# Patient Record
Sex: MALE | Race: WHITE | NOT HISPANIC OR LATINO | Employment: OTHER | URBAN - NONMETROPOLITAN AREA
[De-identification: names, ages, dates, MRNs, and addresses within clinical notes are randomized per-mention and may not be internally consistent; named-entity substitution may affect disease eponyms.]

---

## 2017-09-13 ENCOUNTER — TELEPHONE (OUTPATIENT)
Dept: URGENT CARE | Facility: CLINIC | Age: 22
End: 2017-09-13

## 2019-05-15 ENCOUNTER — OFFICE VISIT (OUTPATIENT)
Dept: UROLOGY | Facility: CLINIC | Age: 24
End: 2019-05-15

## 2019-05-15 VITALS
OXYGEN SATURATION: 98 % | HEIGHT: 69 IN | SYSTOLIC BLOOD PRESSURE: 130 MMHG | HEART RATE: 57 BPM | WEIGHT: 175 LBS | BODY MASS INDEX: 25.92 KG/M2 | DIASTOLIC BLOOD PRESSURE: 80 MMHG | TEMPERATURE: 97.1 F

## 2019-05-15 DIAGNOSIS — E29.9 TESTICULAR DYSFUNCTION, UNSPECIFIED: Primary | ICD-10-CM

## 2019-05-15 PROCEDURE — 99203 OFFICE O/P NEW LOW 30 MIN: CPT | Performed by: UROLOGY

## 2019-05-15 NOTE — PROGRESS NOTES
Chief Complaint  Infertility    HPI  Mr. Ochoa Gomez is a 23 y.o. gentleman who presents to the clinic today due to concern for infertility.  He is in the  and was recently deployed this past winter.  He and his wife have been attempting conception for 2 months.  He has impregnated a partner in the past.    Sexual method  Neither use birth control and they do not use commercial lubricants.  [place] frequency: approximately 4 times per week. They have been having intercourse sporadically.     Partner  His partner is 24 years old and she has not previously been pregnant.  She has chronic medical conditions - PCOS (on metformin now).   She did not have regular menses prior to PCOS Tx.  She undergone a fertility work up - she is on clomid, has reportedly had bloodwork, but unclear if she has had HSG    Fertility-related history  No other surgeries, h/o radiation or childhood illnesses.      Fertility-related ROS  He does not use marijuana.  He does not have daily fatigue or recently decreased sexual drive/libido.  He has not noticed flushing or hot flashes  He not noticed breast swelling, tenderness or galactorrhea.  He has not had a history of STDs.  He does not have impairments of visual field, smell or chronic bronchitis.  He does not have a history of recent acute illnesses.  He does not use a laptop computer placed in his lap regularly.    He took a testosterone booster in January (prime T - vitamins), OTC while he was deployed. He denies steroid or testosterone usage.     Past Medical History  Past Medical History:   Diagnosis Date   • Seasonal allergies        Past Surgical History  Past Surgical History:   Procedure Laterality Date   • DENTAL PROCEDURE     • FRACTURE SURGERY         Medications    Current Outpatient Medications:   •  cetirizine (zyrTEC) 10 MG tablet, Take 1 tablet by mouth Daily., Disp: 30 tablet, Rfl: 2  •  fluticasone (FLONASE) 50 MCG/ACT nasal spray, 1-2 sprays each  "nostril daily, Disp: 1 each, Rfl: 2  •  ibuprofen (ADVIL,MOTRIN) 800 MG tablet, , Disp: , Rfl:   •  ketotifen (ZADITOR) 0.025 % ophthalmic solution, Administer 1 drop to both eyes 2 (Two) Times a Day., Disp: 1 each, Rfl: 0  •  levocetirizine (XYZAL) 5 MG tablet, Take 5 mg by mouth Every Evening., Disp: , Rfl:   •  montelukast (SINGULAIR) 10 MG tablet, Take 1 tablet by mouth Every Night., Disp: 30 tablet, Rfl: 0  •  ondansetron (ZOFRAN) 4 MG tablet, Take 1 tablet by mouth Every 8 (Eight) Hours As Needed for Nausea or Vomiting., Disp: 15 tablet, Rfl: 0    Allergies  No Known Allergies    Social History  Social History     Socioeconomic History   • Marital status:      Spouse name: Not on file   • Number of children: Not on file   • Years of education: Not on file   • Highest education level: Not on file   Tobacco Use   • Smoking status: Never Smoker   • Smokeless tobacco: Current User   Substance and Sexual Activity   • Alcohol use: Yes   • Drug use: No       Family History  He has no family history fertility-related issues or cystic fibrosis    Review of Systems  Constitutional: No fevers or chills  Skin: Negative for rash  Gastrointestinal: No constipation, nausea or vomiting  Genitourinary: No new lower urinary tract symptoms, gross hematuria or dysuria.  Musculoskeletal: No flank pain  Neurological:  Negative for frequent headaches or dizziness.    Physical Exam  Visit Vitals  /80   Pulse 57   Temp 97.1 °F (36.2 °C)   Ht 175.3 cm (69.02\")   Wt 79.4 kg (175 lb)   SpO2 98%   BMI 25.83 kg/m²     Constitutional: NAD, WDWN.   HEENT: NCAT. Conjunctivae normal.  MMM.    Cardiovascular: Regular rate.  Pulmonary/Chest: Respirations are even and non-labored bilaterally.  Abdominal: Soft. No distension, tenderness, masses or guarding. No CVA tenderness.  Neurological: A + O x 3.  Cranial Nerves II-XII grossly intact. Normal gait.  Extremities: MICHAEL x 4, Warm. No clubbing.  No cyanosis.    Skin: Pink, warm and " dry.  No rashes noted.  Psychiatric:  Normal mood and affect    Genitourinary  Penis: circumcised penis, glans normal, no penile discharge.  No rashes/lesions.  Meatus in normal position   Testes: Left: 25g, Right: 25g, no masses and normal consistency.  Vasa palpable bilaterally.  no clinically palpable varicocele with valsalva.    Labs  None recently    Semen Analysis  None recently    Assessment  23 y.o. male who has been trying to conceive for 2 months.   Risk factors:  None.  He has impregnated partner in the past.  His wife on the other hand however, has PCOS and is undergoing hormone treatment.  It is unclear exactly what her fertility work-up has been.    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    Plan  1.  Semen analysis   2.  FU in 2-3 weeks      No Follow-up on file.    Aris Sampson MD

## 2019-06-24 ENCOUNTER — LAB (OUTPATIENT)
Dept: LAB | Facility: HOSPITAL | Age: 24
End: 2019-06-24

## 2019-06-24 DIAGNOSIS — E29.9 DISORDER OF ENDOCRINE TESTIS: Primary | ICD-10-CM

## 2019-06-24 PROCEDURE — 89320 SEMEN ANAL VOL/COUNT/MOT: CPT

## 2019-07-06 LAB
CHARACTER SMN: NORMAL
COLOR SMN: NORMAL
LIQUEFACTION TIME SMN: NORMAL MIN
PH SMN: 8 [PH]
SPECIMEN VOL SMN: 2 ML (ref 2–5)
SPERM # SMN: 60.4 MILLIONS/ML
SPERM MORPHOLOGY COMMENT: NORMAL
SPERM MOTILE NFR SMN: 60 % MOTILE (ref 50–100)
VISC SMN: NORMAL CP

## 2022-04-04 ENCOUNTER — TELEPHONE (OUTPATIENT)
Dept: UROLOGY | Facility: CLINIC | Age: 27
End: 2022-04-04

## 2022-04-04 NOTE — TELEPHONE ENCOUNTER
Caller: Ochoa Gomez    Relationship: Self    Best call back number: [phone]    What form or medical record are you requesting: PROG NOTE AND SEMEN ANALYISIS    Who is requesting this form or medical record from you: PT    How would you like to receive the form or medical records (pick-up, mail, fax): MAIL  If mail, what is the address:   [address] [place] [id number]    Timeframe paperwork needed: AS SOON AS POSSIBLE     Additional notes: CALL NUMBER LISTED ABOVE TO CONFIRM RECORDS HAVE BEEN SENT. OKAY TO LVM ON PHONE IF NO ANSWER